# Patient Record
Sex: FEMALE | Race: WHITE | NOT HISPANIC OR LATINO | Employment: FULL TIME | URBAN - METROPOLITAN AREA
[De-identification: names, ages, dates, MRNs, and addresses within clinical notes are randomized per-mention and may not be internally consistent; named-entity substitution may affect disease eponyms.]

---

## 2017-04-17 ENCOUNTER — OFFICE VISIT (OUTPATIENT)
Dept: OBSTETRICS AND GYNECOLOGY | Facility: CLINIC | Age: 47
End: 2017-04-17

## 2017-04-17 ENCOUNTER — APPOINTMENT (OUTPATIENT)
Dept: MAMMOGRAPHY | Facility: CLINIC | Age: 47
End: 2017-04-17

## 2017-04-17 VITALS
DIASTOLIC BLOOD PRESSURE: 72 MMHG | WEIGHT: 170.8 LBS | HEIGHT: 66 IN | BODY MASS INDEX: 27.45 KG/M2 | SYSTOLIC BLOOD PRESSURE: 128 MMHG

## 2017-04-17 DIAGNOSIS — Z12.31 VISIT FOR SCREENING MAMMOGRAM: ICD-10-CM

## 2017-04-17 DIAGNOSIS — Z01.419 ENCOUNTER FOR GYNECOLOGICAL EXAMINATION WITHOUT ABNORMAL FINDING: Primary | ICD-10-CM

## 2017-04-17 PROCEDURE — 99396 PREV VISIT EST AGE 40-64: CPT | Performed by: OBSTETRICS & GYNECOLOGY

## 2017-04-17 PROCEDURE — 77067 SCR MAMMO BI INCL CAD: CPT | Performed by: OBSTETRICS & GYNECOLOGY

## 2017-04-17 RX ORDER — KETOPROFEN 75 MG/1
75 CAPSULE ORAL 4 TIMES DAILY
COMMUNITY
Start: 2017-04-03 | End: 2021-02-01

## 2017-04-17 RX ORDER — KETOPROFEN 75 MG/1
CAPSULE ORAL
COMMUNITY
Start: 2017-04-03 | End: 2021-02-01

## 2017-04-17 RX ORDER — CHOLECALCIFEROL (VITAMIN D3) 125 MCG
1 CAPSULE ORAL
COMMUNITY
End: 2021-02-01

## 2017-04-17 NOTE — PROGRESS NOTES
Subjective  CC AE and rash   Chief Complaint   Patient presents with   • Gynecologic Exam     ae, reg periods, has a rash on rt breast      History of Present Illness    Katarina Rico is a 46 y.o. female who presents for annual exam.  Patient relates a red rash on the outer quadrants of her right breast but this has pretty much disappeared with lotion.  She had a mammogram today. BC= Tubal/  Normal periods     Obstetric History:  OB History      Para Term  AB TAB SAB Ectopic Multiple Living    3 3 3                Menstrual History:     Patient's last menstrual period was 2017.       Past Medical History:   Diagnosis Date   • Abnormal Pap smear of cervix    • Fibrocystic breast    • HPV (human papilloma virus) infection      Family History   Problem Relation Age of Onset   • Breast cancer Maternal Grandmother    • Ovarian cancer Maternal Grandmother    • Hypertension Maternal Grandmother    • Hypertension Father    • Heart attack Father    • Hypertension Mother    • Hypertension Brother    • Hypertension Paternal Grandfather    • Prostate cancer Paternal Grandfather    • Hypertension Paternal Grandmother    • Hypertension Maternal Grandfather        The following portions of the patient's history were reviewed and updated as appropriate: allergies, current medications, past family history, past medical history, past social history, past surgical history and problem list.    Review of Systems   Constitutional: Negative.  Negative for fever and unexpected weight change.   HENT: Negative.    Respiratory: Negative for shortness of breath and wheezing.    Cardiovascular: Negative for chest pain, palpitations and leg swelling.   Gastrointestinal: Negative for abdominal pain, anal bleeding and blood in stool.   Genitourinary: Negative for dysuria, pelvic pain, urgency, vaginal bleeding, vaginal discharge and vaginal pain.   Musculoskeletal:        Right breast rash   Skin: Negative.   "  Neurological: Negative.    Hematological: Negative.  Negative for adenopathy.   Psychiatric/Behavioral: Negative.  Negative for dysphoric mood. The patient is not nervous/anxious.             Objective   Physical Exam   Constitutional: She is oriented to person, place, and time. Vital signs are normal. She appears well-developed and well-nourished.   HENT:   Head: Normocephalic.   Neck: Trachea normal. No tracheal deviation present. No thyromegaly present.   Cardiovascular: Normal rate, regular rhythm and normal heart sounds.    No murmur heard.  Pulmonary/Chest: Effort normal and breath sounds normal.   Breasts without masses, tenderness or nipple discharge.  Totally resolved rash of right breast which may have been eczema.   Abdominal: Soft. Normal appearance. She exhibits no mass. There is no hepatosplenomegaly. There is no tenderness. No hernia.   Genitourinary: Rectum normal, vagina normal and uterus normal. Uterus is not enlarged and not tender. Cervix exhibits no motion tenderness. Right adnexum displays no mass and no tenderness. Left adnexum displays no mass and no tenderness. No vaginal discharge found.   Genitourinary Comments: External genitalia normal    Lymphadenopathy:     She has no cervical adenopathy.     She has no axillary adenopathy.   Neurological: She is alert and oriented to person, place, and time.   Skin: Skin is warm and dry. No rash noted.   Psychiatric: She has a normal mood and affect. Her behavior is normal. Cognition and memory are normal.       /72  Ht 66\" (167.6 cm)  Wt 170 lb 12.8 oz (77.5 kg)  LMP 03/17/2017  BMI 27.57 kg/m2    Assessment/Plan   Katarina was seen today for gynecologic exam.    Diagnoses and all orders for this visit:    Encounter for gynecological examination without abnormal finding      Mammogram.  RTO 1 year              "

## 2017-04-20 LAB
CONV .: NORMAL
CYTOLOGIST CVX/VAG CYTO: NORMAL
CYTOLOGY CVX/VAG DOC THIN PREP: NORMAL
DX ICD CODE: NORMAL
HIV 1 & 2 AB SER-IMP: NORMAL
Lab: NORMAL
OTHER STN SPEC: NORMAL
PATH REPORT.FINAL DX SPEC: NORMAL
STAT OF ADQ CVX/VAG CYTO-IMP: NORMAL

## 2018-06-14 ENCOUNTER — OFFICE VISIT (OUTPATIENT)
Dept: OBSTETRICS AND GYNECOLOGY | Facility: CLINIC | Age: 48
End: 2018-06-14

## 2018-06-14 VITALS
SYSTOLIC BLOOD PRESSURE: 110 MMHG | BODY MASS INDEX: 25.23 KG/M2 | DIASTOLIC BLOOD PRESSURE: 70 MMHG | HEIGHT: 66 IN | WEIGHT: 157 LBS

## 2018-06-14 DIAGNOSIS — K59.04 CHRONIC IDIOPATHIC CONSTIPATION: ICD-10-CM

## 2018-06-14 DIAGNOSIS — Z11.3 SCREENING FOR VENEREAL DISEASE (VD): ICD-10-CM

## 2018-06-14 DIAGNOSIS — Z01.411 ENCOUNTER FOR GYNECOLOGICAL EXAMINATION WITH ABNORMAL FINDING: Primary | ICD-10-CM

## 2018-06-14 DIAGNOSIS — N63.10 BREAST MASS, RIGHT: ICD-10-CM

## 2018-06-14 PROCEDURE — 99213 OFFICE O/P EST LOW 20 MIN: CPT | Performed by: OBSTETRICS & GYNECOLOGY

## 2018-06-14 PROCEDURE — 99396 PREV VISIT EST AGE 40-64: CPT | Performed by: OBSTETRICS & GYNECOLOGY

## 2018-06-14 RX ORDER — ESCITALOPRAM OXALATE 20 MG/1
20 TABLET ORAL DAILY
COMMUNITY
End: 2021-02-01

## 2018-06-14 NOTE — PROGRESS NOTES
Subjective    Chief Complaint   Patient presents with   • Gynecologic Exam     AE no problems      History of Present Illness    Katarina Rico is a 48 y.o. female who presents for annual exam. Nonsmoker. Patient is due for mammogram.  She has regular periods and has had a tubal.  Both she and her  have had marital issues recently and recent herpes blood work by her PCP showed positive type I IgG and possibly equivocal type II IgM although her type II IgG was negative.  She has had no breaks of either herpes and is going back for retesting.  Discussed in detail herpes testing.  She would like STD check on her Pap.  She also has had problems with chronic constipation and was given sample of Linzess.      Obstetric History:  OB History      Para Term  AB Living    3 3 3          SAB TAB Ectopic Molar Multiple Live Births                        Menstrual History:     Patient's last menstrual period was 2018.       Past Medical History:   Diagnosis Date   • Abnormal Pap smear of cervix    • Fibrocystic breast    • HPV (human papilloma virus) infection      Family History   Problem Relation Age of Onset   • Breast cancer Maternal Grandmother    • Ovarian cancer Maternal Grandmother    • Hypertension Maternal Grandmother    • Hypertension Father    • Heart attack Father    • Hypertension Mother    • Hypertension Brother    • Hypertension Paternal Grandfather    • Prostate cancer Paternal Grandfather    • Hypertension Paternal Grandmother    • Hypertension Maternal Grandfather        The following portions of the patient's history were reviewed and updated as appropriate: allergies, current medications, past family history, past medical history, past social history, past surgical history and problem list.    Review of Systems   Constitutional: Negative.  Negative for fever and unexpected weight change.   HENT: Negative.    Respiratory: Negative for shortness of breath and wheezing.     Cardiovascular: Negative for chest pain, palpitations and leg swelling.   Gastrointestinal: Positive for constipation. Negative for abdominal pain, anal bleeding and blood in stool.   Genitourinary: Negative for dysuria, pelvic pain, urgency, vaginal bleeding, vaginal discharge and vaginal pain.   Skin: Negative.    Neurological: Negative.    Hematological: Negative.  Negative for adenopathy.   Psychiatric/Behavioral: Negative.  Negative for dysphoric mood. The patient is not nervous/anxious.             Objective   Physical Exam   Constitutional: She is oriented to person, place, and time. Vital signs are normal. She appears well-developed and well-nourished.   HENT:   Head: Normocephalic.   Neck: Trachea normal. No tracheal deviation present. No thyromegaly present.   Cardiovascular: Normal rate, regular rhythm and normal heart sounds.    No murmur heard.  Pulmonary/Chest: Effort normal and breath sounds normal. Right breast exhibits mass. Left breast exhibits no mass, no nipple discharge and no tenderness.   Examination of left breast is totally negative with no masses tenderness or nipple discharge.  Examination at right breast shows no tenderness or nipple discharge, the patient has had a small half centimeter nodule chronically at 7:00.  She also has a new felt to have centimeter nodule at 4:00 today for studies will be done and patient will have recheck in 4-6 weeks.   Abdominal: Soft. Normal appearance. She exhibits no mass. There is no hepatosplenomegaly. There is no tenderness. No hernia.   Genitourinary: Rectum normal, vagina normal and uterus normal. Uterus is not enlarged and not tender. Cervix exhibits no motion tenderness. Right adnexum displays no mass and no tenderness. Left adnexum displays no mass and no tenderness. No vaginal discharge found.   Genitourinary Comments: External genitalia normal    Lymphadenopathy:     She has no cervical adenopathy.     She has no axillary adenopathy.  "  Neurological: She is alert and oriented to person, place, and time.   Skin: Skin is warm and dry. No rash noted.   Psychiatric: She has a normal mood and affect. Her behavior is normal. Cognition and memory are normal.       /70   Ht 167.6 cm (65.98\")   Wt 71.2 kg (157 lb)   LMP 06/09/2018   BMI 25.35 kg/m²     Assessment/Plan   Katarina was seen today for gynecologic exam.    Diagnoses and all orders for this visit:    Encounter for gynecological examination with abnormal finding  -     IGP,CtNg,rfx Apt HPV All Pth    Screening for venereal disease (VD)    Breast mass, right    Chronic idiopathic constipation        Scheduling bilateral diagnostic mammogram and right breast ultrasound. patient to return to office in 4-6 weeks for recheck of right breast regardless of studies.    Counseled about the importance of beginning calcium with vitamin D twice daily.     an additional 15 minutes was spent today going over her herpes antibody results, discussing treatment of her chronic constipation including starting Linzess, discussing workup for STDs including GC chlamydia as other blood work has been performed by PCP, and discussing her new breast mass which needs to be evaluated both with mammogram and ultrasound and possible general surgery consultation if still present at her 4-6 week check.    "

## 2018-06-18 DIAGNOSIS — N63.0 BREAST NODULE: Primary | ICD-10-CM

## 2018-06-18 LAB
C TRACH RRNA CVX QL NAA+PROBE: NEGATIVE
CONV .: NORMAL
CYTOLOGIST CVX/VAG CYTO: NORMAL
CYTOLOGY CVX/VAG DOC THIN PREP: NORMAL
DX ICD CODE: NORMAL
HIV 1 & 2 AB SER-IMP: NORMAL
N GONORRHOEA RRNA CVX QL NAA+PROBE: NEGATIVE
OTHER STN SPEC: NORMAL
PATH REPORT.FINAL DX SPEC: NORMAL
STAT OF ADQ CVX/VAG CYTO-IMP: NORMAL

## 2018-06-26 ENCOUNTER — HOSPITAL ENCOUNTER (OUTPATIENT)
Dept: MAMMOGRAPHY | Facility: HOSPITAL | Age: 48
Discharge: HOME | End: 2018-06-26
Attending: OBSTETRICS & GYNECOLOGY | Admitting: OBSTETRICS & GYNECOLOGY

## 2018-06-26 ENCOUNTER — HOSPITAL ENCOUNTER (OUTPATIENT)
Dept: ULTRASOUND IMAGING | Facility: HOSPITAL | Age: 48
Discharge: HOME | End: 2018-06-26
Attending: OBSTETRICS & GYNECOLOGY

## 2018-06-26 DIAGNOSIS — N63.0 BREAST NODULE: ICD-10-CM

## 2018-06-26 PROCEDURE — 76642 ULTRASOUND BREAST LIMITED: CPT

## 2018-06-26 PROCEDURE — 77066 DX MAMMO INCL CAD BI: CPT

## 2018-07-12 ENCOUNTER — OFFICE VISIT (OUTPATIENT)
Dept: OBSTETRICS AND GYNECOLOGY | Facility: CLINIC | Age: 48
End: 2018-07-12

## 2018-07-12 VITALS
BODY MASS INDEX: 25.71 KG/M2 | SYSTOLIC BLOOD PRESSURE: 108 MMHG | WEIGHT: 160 LBS | HEIGHT: 66 IN | DIASTOLIC BLOOD PRESSURE: 66 MMHG

## 2018-07-12 DIAGNOSIS — N60.19 FIBROCYSTIC BREAST DISEASE (FCBD), UNSPECIFIED LATERALITY: Primary | ICD-10-CM

## 2018-07-12 PROCEDURE — 99212 OFFICE O/P EST SF 10 MIN: CPT | Performed by: OBSTETRICS & GYNECOLOGY

## 2018-07-12 NOTE — PROGRESS NOTES
"Subjective    Chief Complaint   Patient presents with   • Follow-up     fu, re check rt breast      History of Present Illness    Katarina Roman is a 48 y.o. female who presents for Recheck of right breast.  Patient seen recently with questionable cysts at 4 and 7:00 of right breast.  Diagnostic bilateral mammogram and right breast ultrasound totally normal.  Here for recheck.    Obstetric History:  OB History      Para Term  AB Living    3 3 3          SAB TAB Ectopic Molar Multiple Live Births                        Menstrual History:     No LMP recorded.       Past Medical History:   Diagnosis Date   • Abnormal Pap smear of cervix    • Fibrocystic breast    • HPV (human papilloma virus) infection      Family History   Problem Relation Age of Onset   • Breast cancer Maternal Grandmother    • Ovarian cancer Maternal Grandmother    • Hypertension Maternal Grandmother    • Hypertension Father    • Heart attack Father    • Hypertension Mother    • Hypertension Brother    • Hypertension Paternal Grandfather    • Prostate cancer Paternal Grandfather    • Hypertension Paternal Grandmother    • Hypertension Maternal Grandfather        The following portions of the patient's history were reviewed and updated as appropriate: allergies, current medications, past medical history and problem list.    Review of Systems         Objective   Physical Exam  Examination of both breasts today reveals fibrocystic changes only but no definite mass on either side.  Discussed starting vitamin E 400 international units a day and decreasing caffeine as she does have breast tenderness.  /66   Ht 167.6 cm (66\")   Wt 72.6 kg (160 lb)   BMI 25.82 kg/m²     Assessment/Plan   Katarina was seen today for follow-up.    Diagnoses and all orders for this visit:    Fibrocystic breast disease (FCBD), unspecified laterality        Impression plan    patient will do self breast exams and call and come in if she detects " any mass on either side.  She will start her vitamin E as outlined above.

## 2019-06-19 ENCOUNTER — OFFICE VISIT (OUTPATIENT)
Dept: OBSTETRICS AND GYNECOLOGY | Facility: CLINIC | Age: 49
End: 2019-06-19

## 2019-06-19 VITALS
WEIGHT: 194.8 LBS | DIASTOLIC BLOOD PRESSURE: 66 MMHG | SYSTOLIC BLOOD PRESSURE: 124 MMHG | HEIGHT: 66 IN | BODY MASS INDEX: 31.31 KG/M2

## 2019-06-19 DIAGNOSIS — Z12.39 SCREENING FOR BREAST CANCER: ICD-10-CM

## 2019-06-19 DIAGNOSIS — Z01.419 ENCOUNTER FOR GYNECOLOGICAL EXAMINATION WITHOUT ABNORMAL FINDING: Primary | ICD-10-CM

## 2019-06-19 DIAGNOSIS — R63.5 ABNORMAL WEIGHT GAIN: ICD-10-CM

## 2019-06-19 PROCEDURE — 99396 PREV VISIT EST AGE 40-64: CPT | Performed by: OBSTETRICS & GYNECOLOGY

## 2019-06-19 RX ORDER — MELOXICAM 15 MG/1
TABLET ORAL
COMMUNITY
Start: 2019-06-08 | End: 2021-02-01

## 2019-06-19 RX ORDER — FLUTICASONE PROPIONATE 50 MCG
1 SPRAY, SUSPENSION (ML) NASAL DAILY
COMMUNITY
Start: 2018-12-26 | End: 2019-12-26

## 2019-06-19 RX ORDER — DOXYCYCLINE HYCLATE 50 MG/1
1 CAPSULE, GELATIN COATED ORAL 2 TIMES DAILY
Refills: 3 | COMMUNITY
Start: 2019-03-14 | End: 2021-02-01

## 2019-06-19 NOTE — PROGRESS NOTES
Subjective    Chief Complaint   Patient presents with   • Gynecologic Exam     ae, reg periods, no problems      History of Present Illness    Katarina Roman is a 49 y.o. female who presents for annual exam.  Mammogram is being scheduled.  Non-smoker.  Regular menses.  Has had a tubal ligation.  Has had recent abnormal weight gain and would like to check thyroid.    Obstetric History:  OB History      Para Term  AB Living    3 3 3          SAB TAB Ectopic Molar Multiple Live Births                        Menstrual History:     No LMP recorded.       Past Medical History:   Diagnosis Date   • Abnormal Pap smear of cervix    • Fibrocystic breast    • HPV (human papilloma virus) infection      Family History   Problem Relation Age of Onset   • Breast cancer Maternal Grandmother    • Ovarian cancer Maternal Grandmother    • Hypertension Maternal Grandmother    • Hypertension Father    • Heart attack Father    • Hypertension Mother    • Hypertension Brother    • Hypertension Paternal Grandfather    • Prostate cancer Paternal Grandfather    • Hypertension Paternal Grandmother    • Hypertension Maternal Grandfather      Social History     Tobacco Use   Smoking Status Never Smoker   Smokeless Tobacco Never Used     Counseling given: Not Answered      The following portions of the patient's history were reviewed and updated as appropriate: allergies, current medications, past family history, past medical history, past social history, past surgical history and problem list.    Review of Systems   Constitutional: Positive for unexpected weight change. Negative for fever.   HENT: Negative.    Respiratory: Negative for shortness of breath and wheezing.    Cardiovascular: Negative for chest pain, palpitations and leg swelling.   Gastrointestinal: Negative for abdominal pain, anal bleeding and blood in stool.   Genitourinary: Negative for dysuria, pelvic pain, urgency, vaginal bleeding, vaginal discharge  "and vaginal pain.   Skin: Negative.    Neurological: Negative.    Hematological: Negative.  Negative for adenopathy.   Psychiatric/Behavioral: Negative.  Negative for dysphoric mood. The patient is not nervous/anxious.             Objective   Physical Exam   Constitutional: She is oriented to person, place, and time. Vital signs are normal. She appears well-developed and well-nourished.   HENT:   Head: Normocephalic.   Neck: Trachea normal. No tracheal deviation present. No thyromegaly present.   Cardiovascular: Normal rate, regular rhythm and normal heart sounds.   No murmur heard.  Pulmonary/Chest: Effort normal and breath sounds normal.   Breasts without masses, tenderness or nipple discharge   Abdominal: Soft. Normal appearance. She exhibits no mass. There is no hepatosplenomegaly. There is no tenderness. No hernia.   Genitourinary: Rectum normal, vagina normal and uterus normal. Uterus is not enlarged and not tender. Cervix exhibits no motion tenderness. Right adnexum displays no mass and no tenderness. Left adnexum displays no mass and no tenderness. No vaginal discharge found.   Genitourinary Comments: External genitalia normal    Lymphadenopathy:     She has no cervical adenopathy.     She has no axillary adenopathy.   Neurological: She is alert and oriented to person, place, and time.   Skin: Skin is warm and dry. No rash noted.   Psychiatric: She has a normal mood and affect. Her behavior is normal. Cognition and memory are normal.       /66   Ht 167.6 cm (66\")   Wt 88.4 kg (194 lb 12.8 oz)   BMI 31.44 kg/m²     Assessment/Plan   Katarina was seen today for gynecologic exam.    Diagnoses and all orders for this visit:    Encounter for gynecological examination without abnormal finding  -     IGP,rfx Aptima HPV All Pth    Abnormal weight gain  -     Thyroid Panel With TSH  -     Comprehensive Metabolic Panel    Screening for breast cancer        Mammogram.   RTO 1 year     Counseled about diet and " exercise.  Also discussed beginning calcium with vitamin D.

## 2019-06-20 LAB
ALBUMIN SERPL-MCNC: 4.7 G/DL (ref 3.5–5.2)
ALBUMIN/GLOB SERPL: 2 G/DL
ALP SERPL-CCNC: 71 U/L (ref 39–117)
ALT SERPL-CCNC: 30 U/L (ref 1–33)
AST SERPL-CCNC: 28 U/L (ref 1–32)
BILIRUB SERPL-MCNC: <0.2 MG/DL (ref 0.2–1.2)
BUN SERPL-MCNC: 10 MG/DL (ref 6–20)
BUN/CREAT SERPL: 12 (ref 7–25)
CALCIUM SERPL-MCNC: 9.7 MG/DL (ref 8.6–10.5)
CHLORIDE SERPL-SCNC: 101 MMOL/L (ref 98–107)
CO2 SERPL-SCNC: 26.1 MMOL/L (ref 22–29)
CREAT SERPL-MCNC: 0.83 MG/DL (ref 0.57–1)
FT4I SERPL CALC-MCNC: 1.9 (ref 1.2–4.9)
GLOBULIN SER CALC-MCNC: 2.4 GM/DL
GLUCOSE SERPL-MCNC: 105 MG/DL (ref 65–99)
POTASSIUM SERPL-SCNC: 4.4 MMOL/L (ref 3.5–5.2)
PROT SERPL-MCNC: 7.1 G/DL (ref 6–8.5)
SODIUM SERPL-SCNC: 142 MMOL/L (ref 136–145)
T3RU NFR SERPL: 26 % (ref 24–39)
T4 SERPL-MCNC: 7.4 UG/DL (ref 4.5–12)
TSH SERPL DL<=0.005 MIU/L-ACNC: 1.56 UIU/ML (ref 0.45–4.5)

## 2019-06-21 LAB
CONV .: NORMAL
CYTOLOGIST CVX/VAG CYTO: NORMAL
CYTOLOGY CVX/VAG DOC CYTO: NORMAL
CYTOLOGY CVX/VAG DOC THIN PREP: NORMAL
DX ICD CODE: NORMAL
HIV 1 & 2 AB SER-IMP: NORMAL
OTHER STN SPEC: NORMAL
STAT OF ADQ CVX/VAG CYTO-IMP: NORMAL

## 2019-07-01 ENCOUNTER — PROCEDURE VISIT (OUTPATIENT)
Dept: OBSTETRICS AND GYNECOLOGY | Facility: CLINIC | Age: 49
End: 2019-07-01

## 2019-07-01 ENCOUNTER — APPOINTMENT (OUTPATIENT)
Dept: WOMENS IMAGING | Facility: HOSPITAL | Age: 49
End: 2019-07-01

## 2019-07-01 DIAGNOSIS — Z12.31 VISIT FOR SCREENING MAMMOGRAM: Primary | ICD-10-CM

## 2019-07-01 PROCEDURE — 77067 SCR MAMMO BI INCL CAD: CPT | Performed by: OBSTETRICS & GYNECOLOGY

## 2019-07-01 PROCEDURE — 77067 SCR MAMMO BI INCL CAD: CPT | Performed by: RADIOLOGY

## 2019-10-15 ENCOUNTER — OFFICE (OUTPATIENT)
Dept: URBAN - METROPOLITAN AREA CLINIC 75 | Facility: CLINIC | Age: 49
End: 2019-10-15
Payer: COMMERCIAL

## 2019-10-15 VITALS
SYSTOLIC BLOOD PRESSURE: 122 MMHG | OXYGEN SATURATION: 97 % | DIASTOLIC BLOOD PRESSURE: 82 MMHG | WEIGHT: 191 LBS | RESPIRATION RATE: 10 BRPM | HEART RATE: 78 BPM

## 2019-10-15 DIAGNOSIS — K62.5 HEMORRHAGE OF ANUS AND RECTUM: ICD-10-CM

## 2019-10-15 DIAGNOSIS — R19.4 CHANGE IN BOWEL HABIT: ICD-10-CM

## 2019-10-15 DIAGNOSIS — K59.00 CONSTIPATION, UNSPECIFIED: ICD-10-CM

## 2019-10-15 PROCEDURE — 99204 OFFICE O/P NEW MOD 45 MIN: CPT | Performed by: INTERNAL MEDICINE

## 2019-10-21 ENCOUNTER — AMBULATORY SURGICAL CENTER (OUTPATIENT)
Dept: URBAN - METROPOLITAN AREA SURGERY 17 | Facility: SURGERY | Age: 49
End: 2019-10-21
Payer: COMMERCIAL

## 2019-10-21 VITALS
DIASTOLIC BLOOD PRESSURE: 58 MMHG | RESPIRATION RATE: 16 BRPM | SYSTOLIC BLOOD PRESSURE: 110 MMHG | HEART RATE: 85 BPM | OXYGEN SATURATION: 97 % | DIASTOLIC BLOOD PRESSURE: 74 MMHG | SYSTOLIC BLOOD PRESSURE: 124 MMHG | HEART RATE: 87 BPM | HEIGHT: 66 IN | HEART RATE: 75 BPM | SYSTOLIC BLOOD PRESSURE: 115 MMHG | DIASTOLIC BLOOD PRESSURE: 68 MMHG | RESPIRATION RATE: 18 BRPM | RESPIRATION RATE: 15 BRPM | WEIGHT: 183 LBS | TEMPERATURE: 98.1 F | RESPIRATION RATE: 14 BRPM | HEART RATE: 73 BPM | SYSTOLIC BLOOD PRESSURE: 99 MMHG | HEART RATE: 84 BPM | HEART RATE: 76 BPM | OXYGEN SATURATION: 98 % | SYSTOLIC BLOOD PRESSURE: 109 MMHG | TEMPERATURE: 98 F | DIASTOLIC BLOOD PRESSURE: 67 MMHG | DIASTOLIC BLOOD PRESSURE: 76 MMHG | SYSTOLIC BLOOD PRESSURE: 98 MMHG | HEART RATE: 79 BPM

## 2019-10-21 DIAGNOSIS — K64.1 SECOND DEGREE HEMORRHOIDS: ICD-10-CM

## 2019-10-21 DIAGNOSIS — K57.30 DIVERTICULOSIS OF LARGE INTESTINE WITHOUT PERFORATION OR ABS: ICD-10-CM

## 2019-10-21 DIAGNOSIS — K62.5 HEMORRHAGE OF ANUS AND RECTUM: ICD-10-CM

## 2019-10-21 DIAGNOSIS — K59.00 CONSTIPATION, UNSPECIFIED: ICD-10-CM

## 2019-10-21 PROCEDURE — 45378 DIAGNOSTIC COLONOSCOPY: CPT | Performed by: INTERNAL MEDICINE

## 2019-10-23 ENCOUNTER — OFFICE (OUTPATIENT)
Dept: URBAN - METROPOLITAN AREA CLINIC 75 | Facility: CLINIC | Age: 49
End: 2019-10-23

## 2021-02-01 ENCOUNTER — OFFICE VISIT (OUTPATIENT)
Dept: OBSTETRICS AND GYNECOLOGY | Facility: CLINIC | Age: 51
End: 2021-02-01

## 2021-02-01 ENCOUNTER — APPOINTMENT (OUTPATIENT)
Dept: WOMENS IMAGING | Facility: HOSPITAL | Age: 51
End: 2021-02-01

## 2021-02-01 ENCOUNTER — PROCEDURE VISIT (OUTPATIENT)
Dept: OBSTETRICS AND GYNECOLOGY | Facility: CLINIC | Age: 51
End: 2021-02-01

## 2021-02-01 VITALS
BODY MASS INDEX: 26.2 KG/M2 | DIASTOLIC BLOOD PRESSURE: 80 MMHG | WEIGHT: 163 LBS | SYSTOLIC BLOOD PRESSURE: 118 MMHG | HEIGHT: 66 IN

## 2021-02-01 DIAGNOSIS — Z12.39 ENCOUNTER FOR BREAST CANCER SCREENING USING NON-MAMMOGRAM MODALITY: ICD-10-CM

## 2021-02-01 DIAGNOSIS — Z01.419 ENCOUNTER FOR GYNECOLOGICAL EXAMINATION WITHOUT ABNORMAL FINDING: Primary | ICD-10-CM

## 2021-02-01 DIAGNOSIS — Z12.31 VISIT FOR SCREENING MAMMOGRAM: Primary | ICD-10-CM

## 2021-02-01 PROCEDURE — 77063 BREAST TOMOSYNTHESIS BI: CPT | Performed by: RADIOLOGY

## 2021-02-01 PROCEDURE — 77067 SCR MAMMO BI INCL CAD: CPT | Performed by: RADIOLOGY

## 2021-02-01 PROCEDURE — 77067 SCR MAMMO BI INCL CAD: CPT | Performed by: OBSTETRICS & GYNECOLOGY

## 2021-02-01 PROCEDURE — 77063 BREAST TOMOSYNTHESIS BI: CPT | Performed by: OBSTETRICS & GYNECOLOGY

## 2021-02-01 PROCEDURE — 99396 PREV VISIT EST AGE 40-64: CPT | Performed by: OBSTETRICS & GYNECOLOGY

## 2021-02-01 RX ORDER — FAMOTIDINE 40 MG/1
40 TABLET, FILM COATED ORAL
COMMUNITY
Start: 2021-01-27 | End: 2022-01-27

## 2021-02-01 RX ORDER — BIOTIN 10 MG
TABLET ORAL
COMMUNITY
End: 2022-03-22

## 2021-02-01 RX ORDER — CALCIUM CARBONATE 200(500)MG
1 TABLET,CHEWABLE ORAL DAILY
COMMUNITY

## 2021-02-01 NOTE — PROGRESS NOTES
Subjective    Chief Complaint   Patient presents with   • Gynecologic Exam     AE, Discuss periods       History of Present Illness    Katarina Roman is a 50 y.o. female who presents for annual exam.  Non-smoker.  Mammogram today.  Had colonoscopy a year ago with diverticulosis and not due again for 10 years.  Patient has periods now every 23 to 50 days but only lasting 5 days with no bleeding in between.  Last FSH last month was 7.  No other problems.    Obstetric History:  OB History        3    Para   3    Term   3            AB        Living           SAB        TAB        Ectopic        Molar        Multiple        Live Births                   Menstrual History:     No LMP recorded.       Past Medical History:   Diagnosis Date   • Abnormal Pap smear of cervix    • Fibrocystic breast    • HPV (human papilloma virus) infection      Family History   Problem Relation Age of Onset   • Breast cancer Maternal Grandmother    • Ovarian cancer Maternal Grandmother    • Hypertension Maternal Grandmother    • Hypertension Father    • Heart attack Father    • Hypertension Mother    • Hypertension Brother    • Hypertension Paternal Grandfather    • Prostate cancer Paternal Grandfather    • Hypertension Paternal Grandmother    • Hypertension Maternal Grandfather      Social History     Tobacco Use   Smoking Status Never Smoker   Smokeless Tobacco Never Used         The following portions of the patient's history were reviewed and updated as appropriate: allergies, current medications, past family history, past medical history, past social history, past surgical history and problem list.    Review of Systems   Constitutional: Negative.  Negative for fever and unexpected weight change.   HENT: Negative.    Respiratory: Negative for shortness of breath and wheezing.    Cardiovascular: Negative for chest pain, palpitations and leg swelling.   Gastrointestinal: Negative for abdominal pain, anal bleeding  and blood in stool.   Genitourinary: Negative for dysuria, pelvic pain, urgency, vaginal bleeding, vaginal discharge and vaginal pain.   Skin: Negative.    Neurological: Negative.    Hematological: Negative.  Negative for adenopathy.   Psychiatric/Behavioral: Negative.  Negative for dysphoric mood. The patient is not nervous/anxious.             Objective   Physical Exam  Exam conducted with a chaperone present.   Constitutional:       Appearance: Normal appearance. She is well-developed.   HENT:      Head: Normocephalic.   Neck:      Thyroid: No thyromegaly.      Trachea: Trachea normal. No tracheal deviation.   Cardiovascular:      Rate and Rhythm: Normal rate and regular rhythm.      Heart sounds: Normal heart sounds. No murmur.   Pulmonary:      Effort: Pulmonary effort is normal.      Breath sounds: Normal breath sounds.   Chest:      Breasts:         Right: Normal. No mass, nipple discharge or tenderness.         Left: Normal. No mass, nipple discharge or tenderness.   Abdominal:      Palpations: Abdomen is soft. There is no mass.      Tenderness: There is no abdominal tenderness.      Hernia: No hernia is present.   Genitourinary:     General: Normal vulva.      Labia:         Right: No tenderness or lesion.         Left: No tenderness or lesion.       Urethra: No prolapse or urethral lesion.      Vagina: Normal. No vaginal discharge or lesions.      Cervix: No cervical motion tenderness.      Uterus: Not enlarged and not tender.       Adnexa:         Right: No mass or tenderness.          Left: No mass or tenderness.        Rectum: Normal. No external hemorrhoid or internal hemorrhoid. Normal anal tone.      Comments: External genitalia normal   Lymphadenopathy:      Cervical: No cervical adenopathy.      Upper Body:      Right upper body: No axillary adenopathy.      Left upper body: No axillary adenopathy.   Skin:     General: Skin is warm and dry.      Findings: No rash.   Neurological:      Mental  "Status: She is alert and oriented to person, place, and time.   Psychiatric:         Behavior: Behavior normal.         /80   Ht 167.6 cm (66\")   Wt 73.9 kg (163 lb)   BMI 26.31 kg/m²     Assessment/Plan   Diagnoses and all orders for this visit:    1. Encounter for gynecological examination without abnormal finding (Primary)  -     IGP,rfx Aptima HPV All Pth    2. Encounter for breast cancer screening using non-mammogram modality        Mammogram.  Return to office 1 year.  Counseled about starting vitamin D3 1000 to 2000 units a day as has trouble with constipation and probably will not tolerate calcium.             "

## 2021-02-04 LAB
CONV .: ABNORMAL
CYTOLOGIST CVX/VAG CYTO: ABNORMAL
CYTOLOGY CVX/VAG DOC CYTO: ABNORMAL
CYTOLOGY CVX/VAG DOC THIN PREP: ABNORMAL
DX ICD CODE: ABNORMAL
DX ICD CODE: ABNORMAL
HIV 1 & 2 AB SER-IMP: ABNORMAL
HPV I/H RISK 4 DNA CVX QL PROBE+SIG AMP: NEGATIVE
OTHER STN SPEC: ABNORMAL
PATHOLOGIST CVX/VAG CYTO: ABNORMAL
RECOM F/U CVX/VAG CYTO: ABNORMAL
STAT OF ADQ CVX/VAG CYTO-IMP: ABNORMAL

## 2021-02-05 ENCOUNTER — TELEPHONE (OUTPATIENT)
Dept: OBSTETRICS AND GYNECOLOGY | Facility: CLINIC | Age: 51
End: 2021-02-05

## 2021-02-05 NOTE — TELEPHONE ENCOUNTER
----- Message from Jensen Orellana MD sent at 2/4/2021  5:19 PM EST -----  Angie please tell patient that her Pap was ASCUS but negative HPV so she only needs a repeat Pap in 1 year and nothing else has to be done now.  There is no evidence of any cancer or precancer of the cervix.  Thank you.  ANTHONY

## 2021-02-05 NOTE — TELEPHONE ENCOUNTER
Also messaged pt via [employer] results and could call Monday to speak with Dr. Orellana if any questions. SM

## 2021-04-06 ENCOUNTER — BULK ORDERING (OUTPATIENT)
Dept: CASE MANAGEMENT | Facility: OTHER | Age: 51
End: 2021-04-06

## 2021-04-06 DIAGNOSIS — Z23 IMMUNIZATION DUE: ICD-10-CM

## 2022-03-22 ENCOUNTER — OFFICE VISIT (OUTPATIENT)
Dept: OBSTETRICS AND GYNECOLOGY | Facility: CLINIC | Age: 52
End: 2022-03-22

## 2022-03-22 ENCOUNTER — APPOINTMENT (OUTPATIENT)
Dept: WOMENS IMAGING | Facility: HOSPITAL | Age: 52
End: 2022-03-22

## 2022-03-22 ENCOUNTER — PROCEDURE VISIT (OUTPATIENT)
Dept: OBSTETRICS AND GYNECOLOGY | Facility: CLINIC | Age: 52
End: 2022-03-22

## 2022-03-22 VITALS
HEIGHT: 66 IN | SYSTOLIC BLOOD PRESSURE: 114 MMHG | DIASTOLIC BLOOD PRESSURE: 76 MMHG | WEIGHT: 185 LBS | BODY MASS INDEX: 29.73 KG/M2

## 2022-03-22 DIAGNOSIS — Z01.419 WOMEN'S ANNUAL ROUTINE GYNECOLOGICAL EXAMINATION: Primary | ICD-10-CM

## 2022-03-22 DIAGNOSIS — Z12.31 VISIT FOR SCREENING MAMMOGRAM: Primary | ICD-10-CM

## 2022-03-22 PROCEDURE — 99396 PREV VISIT EST AGE 40-64: CPT | Performed by: NURSE PRACTITIONER

## 2022-03-22 PROCEDURE — 77063 BREAST TOMOSYNTHESIS BI: CPT | Performed by: RADIOLOGY

## 2022-03-22 PROCEDURE — 77063 BREAST TOMOSYNTHESIS BI: CPT | Performed by: OBSTETRICS & GYNECOLOGY

## 2022-03-22 PROCEDURE — 77067 SCR MAMMO BI INCL CAD: CPT | Performed by: OBSTETRICS & GYNECOLOGY

## 2022-03-22 PROCEDURE — 77067 SCR MAMMO BI INCL CAD: CPT | Performed by: RADIOLOGY

## 2022-03-22 RX ORDER — MAGNESIUM 64 MG (MAGNESIUM CHLORIDE) TABLET,DELAYED RELEASE
COMMUNITY
Start: 2022-03-14

## 2022-03-22 RX ORDER — CYANOCOBALAMIN (VITAMIN B-12) 500 MCG
TABLET ORAL
COMMUNITY
Start: 2022-03-07

## 2022-03-22 NOTE — PROGRESS NOTES
GYN Annual Exam     Chief Complaint   Patient presents with   • Gynecologic Exam     Annual exam - last pap 2021 ASCUS, HPV positive. Colonoscopy . MG today.        Katarina Roman is a 51 y.o. female who presents for annual well woman exam. She is regular menses most months. She did have no menses in December. Denies hot flashes or night sweats. FSH 8.1 last month  Prior tubal ligation. She denies vaginal spotting or discharge. She completes SBE monthly.     This is my first time meeting Katarina Roman  She is a patient of Dr. Bishop.    OB History        3    Para   3    Term   3            AB        Living           SAB        IAB        Ectopic        Molar        Multiple        Live Births                  LMP 22  Mammogram: today  Colonoscopy: , repeat due in   Last Pap : 2021 ASCUS, negative HPV  History of abnormal Pap smear: yes -  ASCUS , Hx HPV, prior colposcopy   Family history of uterine, colon or ovarian cancer: yes, MGM ovarian  Family history of breast cancer: yes - MGM  History of abnormal mammogram: yes - fibrocystic tissue      Past Medical History:   Diagnosis Date   • Abnormal Pap smear of cervix    • Fibrocystic breast    • HPV (human papilloma virus) infection        Past Surgical History:   Procedure Laterality Date   • BUNIONECTOMY     •  SECTION     • FOOT SURGERY     • TUBAL ABDOMINAL LIGATION           Current Outpatient Medications:   •  calcium carbonate (TUMS) 500 MG chewable tablet, Chew 1 tablet Daily., Disp: , Rfl:   •  Cholecalciferol 50 MCG ( UT) tablet, Take  by mouth., Disp: , Rfl:   •  COLLAGEN PO, , Disp: , Rfl:   •  Cyanocobalamin (Vitamin B 12) 500 MCG tablet, , Disp: , Rfl:   •  magnesium chloride ER (Mag64) 64 MG DR tablet, , Disp: , Rfl:     Allergies   Allergen Reactions   • Amoxicillin Hives   • Ampicillin Hives   • Penicillins Hives     All cillins  All cillins  All cillins  All cillins   • Codeine  "Rash       Social History     Tobacco Use   • Smoking status: Never Smoker   • Smokeless tobacco: Never Used   Substance Use Topics   • Alcohol use: Yes     Comment: social   • Drug use: No       Family History   Problem Relation Age of Onset   • Breast cancer Maternal Grandmother    • Ovarian cancer Maternal Grandmother    • Hypertension Maternal Grandmother    • Hypertension Father    • Heart attack Father    • Hypertension Mother    • Hypertension Brother    • Hypertension Paternal Grandfather    • Prostate cancer Paternal Grandfather    • Hypertension Paternal Grandmother    • Hypertension Maternal Grandfather        Review of Systems   Constitutional: Negative for chills, fatigue and fever.   Gastrointestinal: Negative for abdominal distention, abdominal pain, nausea and vomiting.   Endocrine: Negative for cold intolerance and heat intolerance.   Genitourinary: Negative for dyspareunia, dysuria, menstrual problem, pelvic pain, vaginal bleeding, vaginal discharge and vaginal pain.   Musculoskeletal: Negative for gait problem.   Skin: Negative for rash.   Neurological: Negative for dizziness and headaches.   Hematological: Does not bruise/bleed easily.   Psychiatric/Behavioral: Negative for behavioral problems.       /76   Ht 167.6 cm (66\")   Wt 83.9 kg (185 lb)   LMP 02/25/2022   BMI 29.86 kg/m²     Physical Exam  Constitutional:       Appearance: Normal appearance.   Genitourinary:      Vulva, bladder and urethral meatus normal.      No lesions in the vagina.      Right Labia: No rash, tenderness, lesions, skin changes or Bartholin's cyst.     Left Labia: No tenderness, lesions, skin changes, Bartholin's cyst or rash.     No labial fusion noted.      No inguinal adenopathy present in the right or left side.     No vaginal discharge, erythema, tenderness, bleeding or ulceration.      No vaginal prolapse present.     No vaginal atrophy present.       Right Adnexa: not tender, not full, not palpable, no " mass present and not absent.     Left Adnexa: not tender, not full, not palpable, no mass present and not absent.     No cervical motion tenderness, discharge, friability, lesion, polyp, nabothian cyst or eversion.      Uterus is not enlarged, fixed, tender, irregular or prolapsed.      No uterine mass detected.     No urethral tenderness or mass present.      Pelvic exam was performed with patient in the lithotomy position.   Breasts: Breasts are symmetrical.      Right: Present. No swelling, bleeding, inverted nipple, mass, nipple discharge, skin change, tenderness, breast implant, axillary adenopathy or supraclavicular adenopathy.      Left: Present. No swelling, bleeding, inverted nipple, mass, nipple discharge, skin change, tenderness, breast implant, axillary adenopathy or supraclavicular adenopathy.       HENT:      Head: Normocephalic and atraumatic.   Eyes:      Pupils: Pupils are equal, round, and reactive to light.   Cardiovascular:      Rate and Rhythm: Normal rate.   Pulmonary:      Effort: Pulmonary effort is normal.   Abdominal:      General: There is no distension.      Palpations: Abdomen is soft. There is no mass.      Tenderness: There is no abdominal tenderness. There is no guarding.      Hernia: No hernia is present. There is no hernia in the left inguinal area or right inguinal area.   Musculoskeletal:         General: Normal range of motion.      Cervical back: Normal range of motion and neck supple. No tenderness.   Lymphadenopathy:      Cervical: No cervical adenopathy.      Upper Body:      Right upper body: No supraclavicular, axillary or pectoral adenopathy.      Left upper body: No supraclavicular, axillary or pectoral adenopathy.      Lower Body: No right inguinal adenopathy. No left inguinal adenopathy.   Neurological:      General: No focal deficit present.      Mental Status: She is alert and oriented to person, place, and time.      Cranial Nerves: No cranial nerve deficit.    Skin:     General: Skin is warm and dry.   Psychiatric:         Mood and Affect: Mood normal.         Behavior: Behavior normal.         Thought Content: Thought content normal.         Judgment: Judgment normal.   Vitals and nursing note reviewed.       Assessment    Diagnoses and all orders for this visit:    1. Women's annual routine gynecological examination (Primary)  -     IGP, Rfx Aptima HPV ASCU       Plan     1) Breast Health - Clinical breast exam & mammogram yearly, Self breast awareness. Schedule screening mammogram.   2) Pap - collected  3) STD-no  4) Smoking status- nonsmoker  5) Colon health - screening colonoscopy recommended if not up to date  6) Patient's Body mass index is 29.86 kg/m². indicating that she is overweight by BMI (BMI 25-29.9).   7) Bone health - Weight bearing exercise, dietary calcium recommendations and vitamin D  reviewed.   8) Encouraged 150 minutes of exercise per week if not medically contraindicated  9) Discussed perimenopausal expectations. Informed menopause is defined as no menses for one full year, any bleeding after one year should be f/u with in the office    Follow up prn and one year    Philly Willard, APRN  3/22/2022  09:11 EDT

## 2023-07-20 ENCOUNTER — OFFICE VISIT (OUTPATIENT)
Dept: OBSTETRICS AND GYNECOLOGY | Facility: CLINIC | Age: 53
End: 2023-07-20
Payer: COMMERCIAL

## 2023-07-20 VITALS
BODY MASS INDEX: 28.28 KG/M2 | DIASTOLIC BLOOD PRESSURE: 82 MMHG | HEIGHT: 66 IN | WEIGHT: 176 LBS | SYSTOLIC BLOOD PRESSURE: 117 MMHG

## 2023-07-20 DIAGNOSIS — N95.0 POSTMENOPAUSAL BLEEDING: Primary | ICD-10-CM

## 2023-07-20 DIAGNOSIS — N83.202 CYST OF LEFT OVARY: ICD-10-CM

## 2023-07-20 NOTE — PROGRESS NOTES
"Subjective    Chief Complaint   Patient presents with    Follow-up     Patient is here for a f/u for u/s results.      History of Present Illness    Katarina Roman is a 53 y.o. female who presents for evaluation of perimenopausal bleeding.  Patient had an FSH recently which was just over the postmenopausal level.  An ultrasound today showed an 8 mm endometrial stripe with a 5 cm simple left ovarian cyst.  We discussed further evaluation including endometrial biopsy and patient was in agreement    Obstetric History:  OB History          3    Para   3    Term   3            AB        Living             SAB        IAB        Ectopic        Molar        Multiple        Live Births                   Menstrual History:     No LMP recorded. Patient is perimenopausal.       Past Medical History:   Diagnosis Date    Abnormal Pap smear of cervix     Anxiety     Depression     Fibrocystic breast     History of removal of skin mole     HPV (human papilloma virus) infection      Family History   Problem Relation Age of Onset    Breast cancer Maternal Grandmother     Ovarian cancer Maternal Grandmother     Hypertension Maternal Grandmother     Hypertension Father     Heart attack Father     Hypertension Mother     Hypertension Brother     Hypertension Paternal Grandfather     Prostate cancer Paternal Grandfather     Hypertension Paternal Grandmother     Hypertension Maternal Grandfather        The following portions of the patient's history were reviewed and updated as appropriate: allergies, current medications, past medical history, past surgical history, and problem list.    Review of Systems  Positive only for perimenopausal bleeding       Objective   Physical Exam  Without the use of a tenaculum, uterus was sounded to 7 cm and endometrial biopsy performed twice without problem.  Patient tolerated the procedure well.  /82   Ht 167.6 cm (66\")   Wt 79.8 kg (176 lb)   BMI 28.41 kg/m² "     Assessment & Plan   Diagnoses and all orders for this visit:    1. Postmenopausal bleeding (Primary)  -     Endometrial Biopsy  -     Reference Histopathology    2. Cyst of left ovary        Patient will call 1 week for results.  Patient will also return in 8 weeks for a follow-up ultrasound due to her ovarian cyst.

## 2023-07-27 ENCOUNTER — TELEPHONE (OUTPATIENT)
Dept: OBSTETRICS AND GYNECOLOGY | Facility: CLINIC | Age: 53
End: 2023-07-27
Payer: COMMERCIAL

## 2023-07-27 LAB
PATH REPORT.FINAL DX SPEC: NORMAL
PATH REPORT.GROSS SPEC: NORMAL
PATH REPORT.SITE OF ORIGIN SPEC: NORMAL
PATHOLOGIST NAME: NORMAL
PAYMENT PROCEDURE: NORMAL

## 2023-09-21 ENCOUNTER — OFFICE VISIT (OUTPATIENT)
Dept: OBSTETRICS AND GYNECOLOGY | Facility: CLINIC | Age: 53
End: 2023-09-21
Payer: COMMERCIAL

## 2023-09-21 VITALS
WEIGHT: 176 LBS | SYSTOLIC BLOOD PRESSURE: 120 MMHG | BODY MASS INDEX: 28.28 KG/M2 | HEIGHT: 66 IN | DIASTOLIC BLOOD PRESSURE: 79 MMHG

## 2023-09-21 DIAGNOSIS — N95.0 POSTMENOPAUSAL BLEEDING: Primary | ICD-10-CM

## 2023-09-21 DIAGNOSIS — N83.202 CYST OF LEFT OVARY: ICD-10-CM

## 2023-09-21 RX ORDER — FLUTICASONE PROPIONATE 50 MCG
1 SPRAY, SUSPENSION (ML) NASAL DAILY
COMMUNITY
Start: 2023-08-01

## 2023-09-21 RX ORDER — AZELASTINE 1 MG/ML
2 SPRAY, METERED NASAL
COMMUNITY
Start: 2023-08-01

## 2023-09-21 NOTE — PROGRESS NOTES
Subjective    Chief Complaint   Patient presents with    Follow-up     Discuss u/s      History of Present Illness    Katarina Roman is a 53 y.o. female who presents for follow-up for postmenopausal bleeding and a 5 cm simple left ovarian cyst seen on 2023 ultrasound.  This had been an incidental finding, and a follow-up ultrasound today shows a 4.5 cm complex left ovarian cyst which almost appears to be a different cyst than previously seen.  She also had some additional bleeding but an endometrial biopsy performed last visit was totally benign and her endometrial stripe today is only 3.9 mm.  Her previous FSH had only been 36 which is very much in the perimenopausal range.  She comes in today with her .  She is having no symptoms today although she did have some pain a couple weeks ago.    Obstetric History:  OB History          3    Para   3    Term   3            AB        Living             SAB        IAB        Ectopic        Molar        Multiple        Live Births                   Menstrual History:     No LMP recorded. Patient is perimenopausal.       Past Medical History:   Diagnosis Date    Abnormal Pap smear of cervix     Anxiety     Depression     Fibrocystic breast     History of removal of skin mole     HPV (human papilloma virus) infection      Family History   Problem Relation Age of Onset    Breast cancer Maternal Grandmother     Ovarian cancer Maternal Grandmother     Hypertension Maternal Grandmother     Hypertension Father     Heart attack Father     Hypertension Mother     Hypertension Brother     Hypertension Paternal Grandfather     Prostate cancer Paternal Grandfather     Hypertension Paternal Grandmother     Hypertension Maternal Grandfather        The following portions of the patient's history were reviewed and updated as appropriate: allergies, current medications, past medical history, past surgical history, and problem list.    Review of  "Systems  As per HPI       Objective   Physical Exam  No exam done today  /79   Ht 167.6 cm (66\")   Wt 79.8 kg (176 lb)   BMI 28.41 kg/m²     Assessment & Plan   Diagnoses and all orders for this visit:    1. Postmenopausal bleeding (Primary)  -     Follicle Stimulating Hormone    2. Cyst of left ovary  -       -     US Non-ob Transvaginal; Future        Impression and plan.  Long discussion today concerning ultrasound findings including complex left ovarian cyst.  I discussed getting a , and repeating the ultrasound in 4 weeks to see if this cyst may be resolving.  Also discussed repeating an FSH as she is very much in the perimenopausal age and it would be nice to know if it registers postmenopausal at this time.  She understands that her endometrial stripe and a very adequate endometrial biopsy were both very reassuring, but if we continue to have postmenopausal bleeding a hysteroscopy with D&C would need to be done.  She will return in 4 weeks for follow-up ultrasound and at that time we will decide whether surgery is necessary either for her bleeding or for her ovarian cyst.                "

## 2023-09-22 LAB
CANCER AG125 SERPL-ACNC: 8.5 U/ML (ref 0–38.1)
FSH SERPL-ACNC: 30.8 MIU/ML

## 2023-10-23 ENCOUNTER — OFFICE VISIT (OUTPATIENT)
Dept: OBSTETRICS AND GYNECOLOGY | Facility: CLINIC | Age: 53
End: 2023-10-23
Payer: COMMERCIAL

## 2023-10-23 VITALS
SYSTOLIC BLOOD PRESSURE: 132 MMHG | DIASTOLIC BLOOD PRESSURE: 83 MMHG | HEIGHT: 66 IN | WEIGHT: 176 LBS | BODY MASS INDEX: 28.28 KG/M2

## 2023-10-23 DIAGNOSIS — N83.202 CYST OF LEFT OVARY: ICD-10-CM

## 2023-10-23 DIAGNOSIS — N95.1 PERIMENOPAUSAL: Primary | ICD-10-CM

## 2023-10-23 PROCEDURE — 99213 OFFICE O/P EST LOW 20 MIN: CPT | Performed by: OBSTETRICS & GYNECOLOGY

## 2023-10-23 NOTE — PROGRESS NOTES
"Subjective    Chief Complaint   Patient presents with    Follow-up     Discuss u/s       History of Present Illness    Katarina Roman is a 53 y.o. female who presents for follow-up of complex 4.5 cm left ovarian cyst seen on 2023 ultrasound.  Ultrasound today shows cyst has decreased to 2.5 cm with 4 mm endometrial stripe.  Patient has not had any further bleeding since September.  She did have a benign endometrial biopsy in July, her  is normal, and her FSH is decreased to 30.  She is here with her  today.    Obstetric History:  OB History          3    Para   3    Term   3            AB        Living             SAB        IAB        Ectopic        Molar        Multiple        Live Births                   Menstrual History:     No LMP recorded. Patient is perimenopausal.       Past Medical History:   Diagnosis Date    Abnormal Pap smear of cervix     Anxiety     Depression     Fibrocystic breast     History of removal of skin mole     HPV (human papilloma virus) infection     Ovarian cyst     PMS (premenstrual syndrome)      Family History   Problem Relation Age of Onset    Breast cancer Maternal Grandmother     Ovarian cancer Maternal Grandmother     Hypertension Maternal Grandmother     Hypertension Father     Heart attack Father     Hypertension Mother     Hypertension Brother     Hypertension Paternal Grandfather     Prostate cancer Paternal Grandfather     Hypertension Paternal Grandmother     Hypertension Maternal Grandfather        The following portions of the patient's history were reviewed and updated as appropriate: allergies, current medications, past medical history, past surgical history, and problem list.    Review of Systems  Negative       Objective   Physical Exam  Exam not performed today.  /83   Ht 167.6 cm (66\")   Wt 79.8 kg (176 lb)   BMI 28.41 kg/m²     Assessment & Plan   Diagnoses and all orders for this visit:    1. Perimenopausal " (Primary)    2. Cyst of left ovary        Impression and plan.  Long discussion today concerning perimenopausal bleeding and ovarian cyst.  To begin with, since her cyst has steadily decreased in size, and she is having no symptoms, there is no need for further ultrasound at this time.  She did have a normal Ca1 25 as well.           She no longer is having any bleeding and her FSH is really in the perimenopausal range.  Given her benign endometrial biopsy and thin endometrial lining on ultrasound, I feel nothing else needs to be done at this time.  She understands that any abnormal bleeding such as prolonged menstrual cycles etc. would probably necessitate a hysteroscopy with D&C regardless of endometrial biopsy results.  She stated she would call if she has any abnormal bleeding.

## 2023-12-21 NOTE — PROGRESS NOTES
Completed screening mammo 7/1/2019.   Outreach attempt was made to schedule a follow up visit. This was the second attempt. Contact was made, appointment refused. Patient has a Pcp Jovani Finnegan that she is seeing.

## 2024-01-17 ENCOUNTER — HOSPITAL ENCOUNTER (OUTPATIENT)
Dept: BONE DENSITY | Facility: HOSPITAL | Age: 54
Discharge: HOME | End: 2024-01-17
Admitting: OBSTETRICS & GYNECOLOGY
Payer: COMMERCIAL

## 2024-01-17 DIAGNOSIS — N95.1 MENOPAUSAL STATE: ICD-10-CM

## 2024-01-17 PROCEDURE — 77080 DXA BONE DENSITY AXIAL: CPT

## 2024-02-05 ENCOUNTER — TELEPHONE (OUTPATIENT)
Dept: OBSTETRICS AND GYNECOLOGY | Facility: CLINIC | Age: 54
End: 2024-02-05
Payer: COMMERCIAL

## 2024-02-05 NOTE — TELEPHONE ENCOUNTER
DELETE AFTER REVIEWING: Telephone encounter to be sent to the clinical pool.    Caller: Katarina Sotrey    Relationship: Self    Best call back number: [phone]    Caller requesting test results: DISCUSS DEXA    What test was performed: DEXA    When was the test performed: 1/17/24    Where was the test performed:  [facility]    Additional notes: PT SAW RESULTS ON MYCHART BUT WANTED TO VERIFY IF SHE CAN DISCUSS RESULTS WITH DR. MARTINEZ DURING HER ANNUAL IN JULY OR IF SHE NEEDS TO COME IN SOONER

## 2024-07-03 ENCOUNTER — OFFICE VISIT (OUTPATIENT)
Dept: OBSTETRICS AND GYNECOLOGY | Facility: CLINIC | Age: 54
End: 2024-07-03
Payer: COMMERCIAL

## 2024-07-03 ENCOUNTER — PROCEDURE VISIT (OUTPATIENT)
Dept: OBSTETRICS AND GYNECOLOGY | Facility: CLINIC | Age: 54
End: 2024-07-03
Payer: COMMERCIAL

## 2024-07-03 VITALS
HEIGHT: 66 IN | WEIGHT: 191.8 LBS | DIASTOLIC BLOOD PRESSURE: 88 MMHG | SYSTOLIC BLOOD PRESSURE: 128 MMHG | BODY MASS INDEX: 30.82 KG/M2

## 2024-07-03 DIAGNOSIS — Z12.31 VISIT FOR SCREENING MAMMOGRAM: Primary | ICD-10-CM

## 2024-07-03 DIAGNOSIS — Z12.31 BREAST CANCER SCREENING BY MAMMOGRAM: ICD-10-CM

## 2024-07-03 DIAGNOSIS — Z01.419 ENCOUNTER FOR WELL WOMAN EXAM: Primary | ICD-10-CM

## 2024-07-03 DIAGNOSIS — N95.1 PERIMENOPAUSAL: ICD-10-CM

## 2024-07-03 DIAGNOSIS — Z12.4 CERVICAL CANCER SCREENING: ICD-10-CM

## 2024-07-03 DIAGNOSIS — M85.80 OSTEOPENIA, UNSPECIFIED LOCATION: ICD-10-CM

## 2024-07-03 NOTE — PROGRESS NOTES
GYN Annual Exam     CC- Here for annual exam.     Katarina Roman is a 54 y.o. female who presents for annual well woman exam. Periods are irregular, lasting 3-4 days. She reports having period in September, March, and April. She states the period in March was very heavy with some clots. Her last period in April was normal amount of bleeding and lasted for 4 days.Dysmenorrhea:moderate, occurring first 1-2 days of flow. Denies intermenstrual bleeding.     OB History          3    Para   3    Term   3            AB        Living             SAB        IAB        Ectopic        Molar        Multiple        Live Births                    Current contraception: tubal ligation  History of abnormal Pap smear: yes - 21- ASCUS, HPV negative   Family history of uterine, colon or ovarian cancer: yes - maternal grandmother had ovarian cancer   History of abnormal mammogram: yes - she has required additional imaging in the past but no biopsies.  Family history of breast cancer: yes - maternal grandmother had breast cancer   Last Pap : 23 - NILM  Last Completed Pap Smear            PAP SMEAR (Every 3 Years) Next due on 2023  IGP,rfx Aptima HPV All Pth    2022  IGP, Rfx Aptima HPV ASCU    2021  IGP,rfx Aptima HPV All Pth    2021  HPV DNA Probe, Direct - ,    2019  IGP,rfx Aptima HPV All Pth    Only the first 5 history entries have been loaded, but more history exists.                   Last mammogram: 23- BIRADS-1   Last Completed Mammogram            Ordered - MAMMOGRAM (Every 2 Years) Ordered on 7/3/2024      2023  Mammo Screening Digital Tomosynthesis Bilateral With CAD    2022  Mammo Screening Digital Tomosynthesis Bilateral With CAD    2021  Mammo Screening Digital Tomosynthesis Bilateral With CAD    2019  Mammo Screening Bilateral With CAD    2018  Mammo Diagnostic Bilateral With CAD    Only the first 5 history  entries have been loaded, but more history exists.                   Last colonoscopy:  - repeat in 10 years   Last Completed Colonoscopy       This patient has no relevant Health Maintenance data.           Last DEXA: 24- osteopenia ; Frax score 4.3%, 0.2%   Parental Hip Fracture: denies    Past Medical History:   Diagnosis Date    Abnormal Pap smear of cervix     Anxiety     Depression     Fibrocystic breast     History of removal of skin mole     HPV (human papilloma virus) infection     Ovarian cyst     PMS (premenstrual syndrome)        Past Surgical History:   Procedure Laterality Date    BUNIONECTOMY       SECTION       SECTION WITH TUBAL  2003    FOOT SURGERY      TUBAL ABDOMINAL LIGATION           Current Outpatient Medications:     calcium carbonate (TUMS) 500 MG chewable tablet, Chew 1 tablet Daily., Disp: , Rfl:     Calcium-Phosphorus-Vitamin D (CITRACAL +D3 PO), , Disp: , Rfl:     COLLAGEN PO, , Disp: , Rfl:     Cyanocobalamin (Vitamin B 12) 500 MCG tablet, , Disp: , Rfl:     fluticasone (FLONASE) 50 MCG/ACT nasal spray, 1 spray into the nostril(s) as directed by provider Daily., Disp: , Rfl:     Magnesium Citrate 200 MG tablet, , Disp: , Rfl:     azelastine (ASTELIN) 0.1 % nasal spray, 2 sprays into the nostril(s) as directed by provider. (Patient not taking: Reported on 7/3/2024), Disp: , Rfl:     Cholecalciferol 50 MCG (2000) tablet, Take  by mouth., Disp: , Rfl:     MAGNESIUM GLUCONATE PO, Take  by mouth., Disp: , Rfl:     Allergies   Allergen Reactions    Amoxicillin Hives    Ampicillin Hives    Penicillins Hives     All cillins  All cillins  All cillins  All cillins    Codeine Rash       Social History     Tobacco Use    Smoking status: Never    Smokeless tobacco: Never   Vaping Use    Vaping status: Never Used   Substance Use Topics    Alcohol use: Yes     Comment: Occasionally    Drug use: No       Family History   Problem Relation Age of Onset    Breast cancer  "Maternal Grandmother     Ovarian cancer Maternal Grandmother     Hypertension Maternal Grandmother     Hypertension Father     Heart attack Father     Hypertension Mother     Hypertension Brother     Hypertension Paternal Grandfather     Prostate cancer Paternal Grandfather     Hypertension Paternal Grandmother     Hypertension Maternal Grandfather        Review of Systems   All other systems reviewed and are negative.      /88   Ht 167.6 cm (65.98\")   Wt 87 kg (191 lb 12.8 oz)   BMI 30.97 kg/m²     Physical Exam  Vitals reviewed. Exam conducted with a chaperone present.   Constitutional:       General: She is not in acute distress.  HENT:      Head: Normocephalic and atraumatic.      Right Ear: External ear normal.      Left Ear: External ear normal.   Eyes:      Extraocular Movements: Extraocular movements intact.      Pupils: Pupils are equal, round, and reactive to light.   Cardiovascular:      Rate and Rhythm: Normal rate.   Pulmonary:      Effort: Pulmonary effort is normal. No respiratory distress.   Chest:   Breasts:     Right: No swelling, bleeding, inverted nipple, mass, nipple discharge, skin change or tenderness.      Left: No swelling, bleeding, inverted nipple, mass, nipple discharge, skin change or tenderness.   Abdominal:      General: There is no distension.      Palpations: Abdomen is soft.      Tenderness: There is no abdominal tenderness. There is no guarding or rebound.   Genitourinary:     General: Normal vulva.      Exam position: Lithotomy position.      Labia:         Right: No rash, tenderness, lesion or injury.         Left: No rash, tenderness, lesion or injury.       Urethra: No prolapse or urethral swelling.      Vagina: No vaginal discharge, erythema, tenderness, bleeding or lesions.      Cervix: Normal.      Uterus: Not enlarged, not fixed and not tender.       Adnexa:         Right: No mass, tenderness or fullness.          Left: No mass, tenderness or fullness.   "   Musculoskeletal:         General: No deformity. Normal range of motion.      Cervical back: Normal range of motion and neck supple.   Lymphadenopathy:      Upper Body:      Right upper body: No supraclavicular or axillary adenopathy.      Left upper body: No supraclavicular or axillary adenopathy.      Lower Body: No right inguinal adenopathy. No left inguinal adenopathy.   Skin:     General: Skin is warm and dry.   Neurological:      General: No focal deficit present.      Mental Status: She is alert and oriented to person, place, and time.   Psychiatric:         Mood and Affect: Mood normal.         Behavior: Behavior normal.            Assessment     1) GYN annual well woman exam.   2) Perimenopausal status  3) Breast cancer screening  4) Cervical cancer screening   5) Osteopenia      Plan     1) Breast Health - Clinical breast exam & mammogram yearly, Self breast awareness monthly. Mammogram performed today for breast cancer screening.   2) Pap - Collected pap smear with HPV cotesting for cervical cancer screening.   3) Smoking status- non-smoker.   4) Colon health - screening colonoscopy recommended if not up to date  5) Bone health - Weight bearing exercise, dietary calcium recommendations and vitamin D reviewed.   6) Activity recommends - Adult 150-300 min/week of multi-component physical activities that include balance training, aerobic and physical strengthening.    7) Follow up prn and one year      Nikki Carlson MD

## 2024-07-08 LAB
CYTOLOGIST CVX/VAG CYTO: NORMAL
CYTOLOGY CVX/VAG DOC CYTO: NORMAL
CYTOLOGY CVX/VAG DOC THIN PREP: NORMAL
DX ICD CODE: NORMAL
HPV I/H RISK 4 DNA CVX QL PROBE+SIG AMP: NEGATIVE
Lab: NORMAL
OTHER STN SPEC: NORMAL
STAT OF ADQ CVX/VAG CYTO-IMP: NORMAL

## 2025-08-11 ENCOUNTER — OFFICE VISIT (OUTPATIENT)
Dept: OBSTETRICS AND GYNECOLOGY | Facility: CLINIC | Age: 55
End: 2025-08-11
Payer: COMMERCIAL

## 2025-08-11 ENCOUNTER — PROCEDURE VISIT (OUTPATIENT)
Dept: OBSTETRICS AND GYNECOLOGY | Facility: CLINIC | Age: 55
End: 2025-08-11
Payer: COMMERCIAL

## 2025-08-11 VITALS — DIASTOLIC BLOOD PRESSURE: 80 MMHG | BODY MASS INDEX: 30.04 KG/M2 | WEIGHT: 186 LBS | SYSTOLIC BLOOD PRESSURE: 123 MMHG

## 2025-08-11 DIAGNOSIS — Z12.4 CERVICAL CANCER SCREENING: ICD-10-CM

## 2025-08-11 DIAGNOSIS — Z01.419 ENCOUNTER FOR WELL WOMAN EXAM WITH ROUTINE GYNECOLOGICAL EXAM: Primary | ICD-10-CM

## 2025-08-11 DIAGNOSIS — N95.1 PERIMENOPAUSE: ICD-10-CM

## 2025-08-11 DIAGNOSIS — M85.80 OSTEOPENIA, SENILE: ICD-10-CM

## 2025-08-11 DIAGNOSIS — Z13.820 OSTEOPOROSIS SCREENING: ICD-10-CM

## 2025-08-11 DIAGNOSIS — Z12.31 BREAST CANCER SCREENING BY MAMMOGRAM: ICD-10-CM

## 2025-08-11 DIAGNOSIS — Z12.31 VISIT FOR SCREENING MAMMOGRAM: Primary | ICD-10-CM

## 2025-08-11 PROCEDURE — 77063 BREAST TOMOSYNTHESIS BI: CPT | Performed by: STUDENT IN AN ORGANIZED HEALTH CARE EDUCATION/TRAINING PROGRAM

## 2025-08-11 PROCEDURE — 77067 SCR MAMMO BI INCL CAD: CPT | Performed by: STUDENT IN AN ORGANIZED HEALTH CARE EDUCATION/TRAINING PROGRAM

## 2025-08-11 RX ORDER — DICLOFENAC SODIUM 75 MG/1
75 TABLET, DELAYED RELEASE ORAL 2 TIMES DAILY
COMMUNITY

## 2025-08-11 RX ORDER — ELETRIPTAN HYDROBROMIDE 40 MG/1
40 TABLET, FILM COATED ORAL
COMMUNITY
Start: 2025-08-08

## 2025-08-11 RX ORDER — IBUPROFEN 800 MG/1
800 TABLET, FILM COATED ORAL EVERY 6 HOURS PRN
COMMUNITY
Start: 2025-08-06

## 2025-08-13 ENCOUNTER — PATIENT MESSAGE (OUTPATIENT)
Dept: OBSTETRICS AND GYNECOLOGY | Facility: CLINIC | Age: 55
End: 2025-08-13
Payer: COMMERCIAL

## 2025-08-15 LAB
CONV .: NORMAL
CYTOLOGIST CVX/VAG CYTO: NORMAL
CYTOLOGY CVX/VAG DOC CYTO: NORMAL
CYTOLOGY CVX/VAG DOC THIN PREP: NORMAL
DX ICD CODE: NORMAL
OTHER STN SPEC: NORMAL
SERVICE CMNT-IMP: NORMAL
STAT OF ADQ CVX/VAG CYTO-IMP: NORMAL